# Patient Record
Sex: MALE | Race: WHITE
[De-identification: names, ages, dates, MRNs, and addresses within clinical notes are randomized per-mention and may not be internally consistent; named-entity substitution may affect disease eponyms.]

---

## 2018-01-28 ENCOUNTER — HOSPITAL ENCOUNTER (EMERGENCY)
Dept: HOSPITAL 19 - COL.ER | Age: 73
Discharge: HOME | End: 2018-01-28
Payer: MEDICARE

## 2018-01-28 VITALS — BODY MASS INDEX: 23.74 KG/M2 | WEIGHT: 160.28 LBS | HEIGHT: 69.02 IN

## 2018-01-28 VITALS — SYSTOLIC BLOOD PRESSURE: 140 MMHG | HEART RATE: 88 BPM | DIASTOLIC BLOOD PRESSURE: 88 MMHG

## 2018-01-28 VITALS — TEMPERATURE: 97.9 F

## 2018-01-28 DIAGNOSIS — E11.9: ICD-10-CM

## 2018-01-28 DIAGNOSIS — I10: ICD-10-CM

## 2018-01-28 DIAGNOSIS — M54.41: Primary | ICD-10-CM

## 2018-01-28 DIAGNOSIS — M79.604: ICD-10-CM

## 2018-01-28 DIAGNOSIS — Z79.84: ICD-10-CM

## 2018-01-28 DIAGNOSIS — E78.5: ICD-10-CM

## 2018-01-28 DIAGNOSIS — Z79.82: ICD-10-CM

## 2019-08-15 ENCOUNTER — HOSPITAL ENCOUNTER (EMERGENCY)
Dept: HOSPITAL 19 - COL.ER | Age: 74
Discharge: HOME | End: 2019-08-15
Payer: MEDICARE

## 2019-08-15 VITALS — WEIGHT: 164.24 LBS | HEIGHT: 67.99 IN | BODY MASS INDEX: 24.89 KG/M2

## 2019-08-15 VITALS — SYSTOLIC BLOOD PRESSURE: 119 MMHG | HEART RATE: 84 BPM | DIASTOLIC BLOOD PRESSURE: 82 MMHG

## 2019-08-15 VITALS — TEMPERATURE: 98.7 F
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
## 2019-08-15 DIAGNOSIS — F17.210: ICD-10-CM

## 2019-08-15 DIAGNOSIS — E11.9: ICD-10-CM

## 2019-08-15 DIAGNOSIS — S22.42XA: Primary | ICD-10-CM

## 2019-08-15 DIAGNOSIS — E78.5: ICD-10-CM

## 2019-08-15 DIAGNOSIS — I10: ICD-10-CM
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
## 2019-08-15 DIAGNOSIS — Z79.82: ICD-10-CM

## 2019-08-15 DIAGNOSIS — Z79.84: ICD-10-CM

## 2019-08-15 DIAGNOSIS — Z88.1: ICD-10-CM

## 2019-08-15 PROCEDURE — A9284 NON-ELECTRONIC SPIROMETER: HCPCS

## 2021-09-24 ENCOUNTER — HOSPITAL ENCOUNTER (OUTPATIENT)
Dept: HOSPITAL 19 - COL.CAR | Age: 76
Discharge: HOME | End: 2021-09-24
Attending: INTERNAL MEDICINE
Payer: MEDICARE

## 2021-09-24 VITALS — TEMPERATURE: 97.9 F | SYSTOLIC BLOOD PRESSURE: 126 MMHG | DIASTOLIC BLOOD PRESSURE: 66 MMHG | HEART RATE: 86 BPM

## 2021-09-24 VITALS — SYSTOLIC BLOOD PRESSURE: 129 MMHG | HEART RATE: 68 BPM | DIASTOLIC BLOOD PRESSURE: 76 MMHG | TEMPERATURE: 97.9 F

## 2021-09-24 VITALS — SYSTOLIC BLOOD PRESSURE: 120 MMHG | HEART RATE: 82 BPM | DIASTOLIC BLOOD PRESSURE: 75 MMHG | TEMPERATURE: 97.9 F

## 2021-09-24 VITALS — WEIGHT: 160.28 LBS | BODY MASS INDEX: 24.29 KG/M2 | HEIGHT: 68.04 IN

## 2021-09-24 VITALS — HEART RATE: 88 BPM | TEMPERATURE: 97.9 F | SYSTOLIC BLOOD PRESSURE: 128 MMHG | DIASTOLIC BLOOD PRESSURE: 68 MMHG

## 2021-09-24 VITALS — SYSTOLIC BLOOD PRESSURE: 153 MMHG | DIASTOLIC BLOOD PRESSURE: 77 MMHG | HEART RATE: 58 BPM

## 2021-09-24 VITALS — DIASTOLIC BLOOD PRESSURE: 82 MMHG | SYSTOLIC BLOOD PRESSURE: 120 MMHG | TEMPERATURE: 97.9 F | HEART RATE: 78 BPM

## 2021-09-24 VITALS — SYSTOLIC BLOOD PRESSURE: 121 MMHG | TEMPERATURE: 97.9 F | HEART RATE: 72 BPM | DIASTOLIC BLOOD PRESSURE: 72 MMHG

## 2021-09-24 VITALS — DIASTOLIC BLOOD PRESSURE: 80 MMHG | HEART RATE: 64 BPM | SYSTOLIC BLOOD PRESSURE: 131 MMHG | TEMPERATURE: 97.9 F

## 2021-09-24 VITALS — HEART RATE: 63 BPM | SYSTOLIC BLOOD PRESSURE: 129 MMHG | DIASTOLIC BLOOD PRESSURE: 82 MMHG | TEMPERATURE: 97.9 F

## 2021-09-24 VITALS — SYSTOLIC BLOOD PRESSURE: 120 MMHG | HEART RATE: 79 BPM | TEMPERATURE: 97.9 F | DIASTOLIC BLOOD PRESSURE: 68 MMHG

## 2021-09-24 DIAGNOSIS — K21.9: ICD-10-CM

## 2021-09-24 DIAGNOSIS — Z79.4: ICD-10-CM

## 2021-09-24 DIAGNOSIS — Z79.02: ICD-10-CM

## 2021-09-24 DIAGNOSIS — Z85.51: ICD-10-CM

## 2021-09-24 DIAGNOSIS — Z20.822: ICD-10-CM

## 2021-09-24 DIAGNOSIS — Z79.82: ICD-10-CM

## 2021-09-24 DIAGNOSIS — F17.290: ICD-10-CM

## 2021-09-24 DIAGNOSIS — Z79.899: ICD-10-CM

## 2021-09-24 DIAGNOSIS — E11.9: ICD-10-CM

## 2021-09-24 DIAGNOSIS — R94.39: ICD-10-CM

## 2021-09-24 DIAGNOSIS — I25.10: Primary | ICD-10-CM

## 2021-09-24 DIAGNOSIS — I35.0: ICD-10-CM

## 2021-09-24 DIAGNOSIS — E78.2: ICD-10-CM

## 2021-09-24 DIAGNOSIS — D64.9: ICD-10-CM

## 2021-09-24 DIAGNOSIS — I10: ICD-10-CM

## 2021-09-24 DIAGNOSIS — F32.9: ICD-10-CM

## 2021-09-24 LAB
ANION GAP SERPL CALC-SCNC: 10 MMOL/L
APTT PPP: 31.6 SECONDS (ref 26–37)
BUN SERPL-MCNC: 27 MG/DL (ref 8–26)
CALCIUM SERPL-MCNC: 10.5 MG/DL (ref 8.4–10.2)
CHLORIDE SERPL-SCNC: 104 MMOL/L (ref 98–107)
CO2 SERPL-SCNC: 24 MEQ/L (ref 23–31)
CREAT SERPL-SCNC: 1.99 MG/DL (ref 0.72–1.25)
ERYTHROCYTE [DISTWIDTH] IN BLOOD BY AUTOMATED COUNT: 13.3 % (ref 11.5–14.5)
GLUCOSE SERPL-MCNC: 142 MG/DL (ref 70–99)
HCT VFR BLD AUTO: 39.2 % (ref 42–52)
HGB BLD-MCNC: 12.8 G/DL (ref 13.5–18)
INR BLD: 1.1 (ref 0.8–3)
MCH RBC QN AUTO: 31 PG (ref 27–31)
MCHC RBC AUTO-ENTMCNC: 33 G/DL (ref 33–37)
MCV RBC AUTO: 95 FL (ref 80–100)
PLATELET # BLD AUTO: 239 K/MM3 (ref 130–400)
PMV BLD AUTO: 10.9 FL (ref 7.4–10.4)
POTASSIUM SERPL-SCNC: 4.3 MMOL/L (ref 3.5–4.5)
PROTHROMBIN TIME: 12.1 SECONDS (ref 9.7–12.8)
RBC # BLD AUTO: 4.14 M/MM3 (ref 4.2–5.6)
SODIUM SERPL-SCNC: 138 MMOL/L (ref 136–145)

## 2021-09-24 NOTE — NUR
15 cc air released from right tband and dressing applied.  INT discontinued
intact. Ambulated to bathroom with steady gait. Discharge instructions given
to pt/spouse. Transferred to private car by chris

## 2021-09-24 NOTE — NUR
Report from Nate LOYA. Transferred by bed from cath lab. Right tband with 15 cc
CD&I, Good pulses and cap refill < 3 secs noted. VSS. Spouse bedside

## 2021-11-29 ENCOUNTER — HOSPITAL ENCOUNTER (OUTPATIENT)
Dept: HOSPITAL 19 - COL.PUL | Age: 76
End: 2021-11-29
Attending: FAMILY MEDICINE
Payer: MEDICARE

## 2021-11-29 DIAGNOSIS — F17.200: ICD-10-CM

## 2021-11-29 DIAGNOSIS — Z12.2: Primary | ICD-10-CM

## 2021-11-29 DIAGNOSIS — R91.1: ICD-10-CM

## 2022-09-01 ENCOUNTER — HOSPITAL ENCOUNTER (OUTPATIENT)
Dept: HOSPITAL 19 - COL.RAD | Age: 77
End: 2022-09-01
Attending: FAMILY MEDICINE
Payer: MEDICARE

## 2022-09-01 DIAGNOSIS — U09.9: ICD-10-CM

## 2022-09-01 DIAGNOSIS — I25.10: Primary | ICD-10-CM

## 2022-09-09 ENCOUNTER — HOSPITAL ENCOUNTER (OUTPATIENT)
Dept: HOSPITAL 19 - COL.ER | Age: 77
Setting detail: OBSERVATION
LOS: 3 days | Discharge: HOME | End: 2022-09-12
Attending: INTERNAL MEDICINE | Admitting: INTERNAL MEDICINE
Payer: MEDICARE

## 2022-09-09 VITALS — SYSTOLIC BLOOD PRESSURE: 107 MMHG | TEMPERATURE: 98.5 F | DIASTOLIC BLOOD PRESSURE: 61 MMHG | HEART RATE: 58 BPM

## 2022-09-09 VITALS — BODY MASS INDEX: 23.66 KG/M2 | WEIGHT: 156.09 LBS | HEIGHT: 67.99 IN

## 2022-09-09 DIAGNOSIS — I25.10: ICD-10-CM

## 2022-09-09 DIAGNOSIS — N17.9: ICD-10-CM

## 2022-09-09 DIAGNOSIS — I35.0: ICD-10-CM

## 2022-09-09 DIAGNOSIS — D50.0: ICD-10-CM

## 2022-09-09 DIAGNOSIS — R53.1: ICD-10-CM

## 2022-09-09 DIAGNOSIS — Z79.02: ICD-10-CM

## 2022-09-09 DIAGNOSIS — Z79.4: ICD-10-CM

## 2022-09-09 DIAGNOSIS — Z79.899: ICD-10-CM

## 2022-09-09 DIAGNOSIS — E87.1: ICD-10-CM

## 2022-09-09 DIAGNOSIS — N18.9: ICD-10-CM

## 2022-09-09 DIAGNOSIS — R53.81: ICD-10-CM

## 2022-09-09 DIAGNOSIS — E87.8: ICD-10-CM

## 2022-09-09 DIAGNOSIS — U07.1: Primary | ICD-10-CM

## 2022-09-09 DIAGNOSIS — F17.200: ICD-10-CM

## 2022-09-09 DIAGNOSIS — E83.52: ICD-10-CM

## 2022-09-09 DIAGNOSIS — E11.22: ICD-10-CM

## 2022-09-09 DIAGNOSIS — R55: ICD-10-CM

## 2022-09-09 DIAGNOSIS — I12.9: ICD-10-CM

## 2022-09-09 DIAGNOSIS — E87.5: ICD-10-CM

## 2022-09-09 DIAGNOSIS — E78.5: ICD-10-CM

## 2022-09-09 DIAGNOSIS — E87.2: ICD-10-CM

## 2022-09-09 LAB
ALBUMIN SERPL-MCNC: 3.6 GM/DL (ref 3.4–4.8)
ALP SERPL-CCNC: 36 U/L (ref 40–150)
ALT SERPL-CCNC: 15 U/L (ref 0–55)
ANION GAP SERPL CALC-SCNC: 16 MMOL/L (ref 7–16)
AST SERPL-CCNC: 18 U/L (ref 5–34)
BASOPHILS # BLD: 0 K/MM3 (ref 0–0.2)
BASOPHILS NFR BLD AUTO: 0.1 % (ref 0–2)
BILIRUB SERPL-MCNC: 0.6 MG/DL (ref 0.2–1.2)
BUN SERPL-MCNC: 64 MG/DL (ref 8–26)
CALCIUM SERPL-MCNC: 10.1 MG/DL (ref 8.4–10.2)
CHLORIDE SERPL-SCNC: 95 MMOL/L (ref 98–107)
CK SERPL-CCNC: 106 U/L (ref 30–200)
CO2 SERPL-SCNC: 19 MMOL/L (ref 23–31)
CREAT SERPL-SCNC: 2.62 MG/DL (ref 0.72–1.25)
CRP SERPL-MCNC: 0.18 MG/DL (ref 0–0.5)
EOSINOPHIL # BLD: 0 K/MM3 (ref 0–0.7)
EOSINOPHIL NFR BLD: 0.1 % (ref 0–4)
ERYTHROCYTE [DISTWIDTH] IN BLOOD BY AUTOMATED COUNT: 14.6 % (ref 11.5–14.5)
GLUCOSE SERPL-MCNC: 362 MG/DL (ref 70–99)
GLUCOSE UR QL STRIP.AUTO: (no result)
GRANULOCYTES # BLD AUTO: 86.4 % (ref 42.2–75.2)
HCT VFR BLD AUTO: 27.4 % (ref 42–52)
HGB BLD-MCNC: 9.1 G/DL (ref 13.5–18)
LYMPHOCYTES # BLD: 1.3 K/MM3 (ref 1.2–3.4)
LYMPHOCYTES NFR BLD: 9.7 % (ref 20–51)
MAGNESIUM SERPL-MCNC: 1.9 MG/DL (ref 1.6–2.6)
MCH RBC QN AUTO: 28 PG (ref 27–31)
MCHC RBC AUTO-ENTMCNC: 33 G/DL (ref 33–37)
MCV RBC AUTO: 84 FL (ref 80–100)
MONOCYTES # BLD: 0.4 K/MM3 (ref 0.1–0.6)
MONOCYTES NFR BLD AUTO: 2.8 % (ref 1.7–9.3)
NEUTROPHILS # BLD: 11.4 K/MM3 (ref 1.4–6.5)
PH UR STRIP.AUTO: 5 [PH] (ref 5–8.5)
PLATELET # BLD AUTO: 397 K/MM3 (ref 130–400)
PMV BLD AUTO: 10.8 FL (ref 7.4–10.4)
POTASSIUM SERPL-SCNC: 4.8 MMOL/L (ref 3.5–4.5)
PROT SERPL-MCNC: 7.3 GM/DL (ref 6.2–8.1)
RBC # BLD AUTO: 3.25 M/MM3 (ref 4.2–5.6)
SODIUM SERPL-SCNC: 130 MMOL/L (ref 136–145)
SP GR UR STRIP.AUTO: 1.01 (ref 1–1.03)
TROPONIN I SERPL-MCNC: 0.01 NG/ML (ref 0–0.03)
URN COLLECT METHOD CLASS: (no result)
UROBILINOGEN UR STRIP.AUTO-MCNC: 0.2 E.U/DL (ref 0.2–1)

## 2022-09-09 PROCEDURE — A9270 NON-COVERED ITEM OR SERVICE: HCPCS

## 2022-09-09 PROCEDURE — C9113 INJ PANTOPRAZOLE SODIUM, VIA: HCPCS

## 2022-09-09 PROCEDURE — G0378 HOSPITAL OBSERVATION PER HR: HCPCS

## 2022-09-10 VITALS — SYSTOLIC BLOOD PRESSURE: 121 MMHG | HEART RATE: 82 BPM | TEMPERATURE: 97.8 F | DIASTOLIC BLOOD PRESSURE: 68 MMHG

## 2022-09-10 VITALS — DIASTOLIC BLOOD PRESSURE: 63 MMHG | TEMPERATURE: 97.8 F | HEART RATE: 75 BPM | SYSTOLIC BLOOD PRESSURE: 124 MMHG

## 2022-09-10 VITALS — SYSTOLIC BLOOD PRESSURE: 159 MMHG | DIASTOLIC BLOOD PRESSURE: 73 MMHG

## 2022-09-10 VITALS — HEART RATE: 72 BPM | SYSTOLIC BLOOD PRESSURE: 104 MMHG | DIASTOLIC BLOOD PRESSURE: 56 MMHG | TEMPERATURE: 98.5 F

## 2022-09-10 VITALS — HEART RATE: 64 BPM | DIASTOLIC BLOOD PRESSURE: 67 MMHG | TEMPERATURE: 98 F | SYSTOLIC BLOOD PRESSURE: 121 MMHG

## 2022-09-10 VITALS — SYSTOLIC BLOOD PRESSURE: 125 MMHG | DIASTOLIC BLOOD PRESSURE: 58 MMHG | TEMPERATURE: 97.9 F | HEART RATE: 68 BPM

## 2022-09-10 VITALS — HEART RATE: 71 BPM | TEMPERATURE: 98.9 F | DIASTOLIC BLOOD PRESSURE: 57 MMHG | SYSTOLIC BLOOD PRESSURE: 109 MMHG

## 2022-09-10 LAB
ALBUMIN SERPL-MCNC: 3 GM/DL (ref 3.4–4.8)
ALP SERPL-CCNC: 30 U/L (ref 40–150)
ALT SERPL-CCNC: 13 U/L (ref 0–55)
ANION GAP SERPL CALC-SCNC: 11 MMOL/L (ref 7–16)
AST SERPL-CCNC: 18 U/L (ref 5–34)
BASOPHILS # BLD: 0 K/MM3 (ref 0–0.2)
BASOPHILS NFR BLD AUTO: 0 % (ref 0–2)
BILIRUB SERPL-MCNC: 0.4 MG/DL (ref 0.2–1.2)
BUN SERPL-MCNC: 54 MG/DL (ref 8–26)
CALCIUM SERPL-MCNC: 8.9 MG/DL (ref 8.4–10.2)
CHLORIDE SERPL-SCNC: 103 MMOL/L (ref 98–107)
CO2 SERPL-SCNC: 22 MMOL/L (ref 23–31)
CREAT SERPL-SCNC: 2.1 MG/DL (ref 0.72–1.25)
EOSINOPHIL # BLD: 0.1 K/MM3 (ref 0–0.7)
EOSINOPHIL NFR BLD: 1.3 % (ref 0–4)
ERYTHROCYTE [DISTWIDTH] IN BLOOD BY AUTOMATED COUNT: 14.9 % (ref 11.5–14.5)
GLUCOSE SERPL-MCNC: 150 MG/DL (ref 70–99)
GRANULOCYTES # BLD AUTO: 67.2 % (ref 42.2–75.2)
HCT VFR BLD AUTO: 23.9 % (ref 42–52)
HGB BLD-MCNC: 7.8 G/DL (ref 13.5–18)
LYMPHOCYTES # BLD: 1.6 K/MM3 (ref 1.2–3.4)
LYMPHOCYTES NFR BLD: 23.9 % (ref 20–51)
MCH RBC QN AUTO: 28 PG (ref 27–31)
MCHC RBC AUTO-ENTMCNC: 33 G/DL (ref 33–37)
MCV RBC AUTO: 86 FL (ref 80–100)
MONOCYTES # BLD: 0.5 K/MM3 (ref 0.1–0.6)
MONOCYTES NFR BLD AUTO: 6.7 % (ref 1.7–9.3)
NEUTROPHILS # BLD: 4.6 K/MM3 (ref 1.4–6.5)
PLATELET # BLD AUTO: 268 K/MM3 (ref 130–400)
PMV BLD AUTO: 10.7 FL (ref 7.4–10.4)
POTASSIUM SERPL-SCNC: 3.7 MMOL/L (ref 3.5–4.5)
PROT SERPL-MCNC: 5.9 GM/DL (ref 6.2–8.1)
RBC # BLD AUTO: 2.78 M/MM3 (ref 4.2–5.6)
SODIUM SERPL-SCNC: 136 MMOL/L (ref 136–145)

## 2022-09-10 NOTE — NUR
PT ARRIVED TO THE MEDICAL FLOOR AT 2130 TO ROOM 305. PT A&O X 4; VSS; O2 RA.
PT DENIED GENERAL PAIN, CHEST PAIN, PALPITATIONS, SOB, N,V,D OR DIZZINESS.
ADMISSIONS ASSESSMENT AND MED REC COMPLETE. PT ORIENTED TO ROOM AND HOSPITAL
POLICY. POC DISCUSSED WITH PT. PT VERBALIZED UNDERSTANDING. ALL QUESTIONS AND
CONCERNS ADDRESSED. PT EXPRESSED NO ADDITIONAL NEEDS AT THIS TIME. CALL LIGHT
WITHIN REACH.

## 2022-09-10 NOTE — NUR
PT VSS, ORIENT AND ALERT X4, AMBULATORY, DUE MEDS GIVEN AS PRESCRIBED NO
ADVERSE REACTION NOTED.PT DENIES COMPLAINTS INCLUDING PAIN.STOOL FOR OCCULT
COLLECTED AND TAKED TO THE LAB.PT AWAINTING CHEST XRAY

## 2022-09-11 VITALS — TEMPERATURE: 98 F | HEART RATE: 78 BPM | DIASTOLIC BLOOD PRESSURE: 61 MMHG | SYSTOLIC BLOOD PRESSURE: 119 MMHG

## 2022-09-11 VITALS — DIASTOLIC BLOOD PRESSURE: 71 MMHG | TEMPERATURE: 97.7 F | SYSTOLIC BLOOD PRESSURE: 130 MMHG | HEART RATE: 72 BPM

## 2022-09-11 VITALS — TEMPERATURE: 98.2 F | HEART RATE: 84 BPM | SYSTOLIC BLOOD PRESSURE: 120 MMHG | DIASTOLIC BLOOD PRESSURE: 60 MMHG

## 2022-09-11 VITALS — SYSTOLIC BLOOD PRESSURE: 135 MMHG | TEMPERATURE: 97.8 F | HEART RATE: 75 BPM | DIASTOLIC BLOOD PRESSURE: 57 MMHG

## 2022-09-11 VITALS — DIASTOLIC BLOOD PRESSURE: 58 MMHG | SYSTOLIC BLOOD PRESSURE: 105 MMHG | HEART RATE: 85 BPM | TEMPERATURE: 98.7 F

## 2022-09-11 LAB
ALBUMIN SERPL-MCNC: 2.9 GM/DL (ref 3.4–4.8)
ALP SERPL-CCNC: 27 U/L (ref 40–150)
ALT SERPL-CCNC: 13 U/L (ref 0–55)
ANION GAP SERPL CALC-SCNC: 9 MMOL/L (ref 7–16)
AST SERPL-CCNC: 20 U/L (ref 5–34)
BASOPHILS # BLD: 0 K/MM3 (ref 0–0.2)
BASOPHILS NFR BLD AUTO: 0.2 % (ref 0–2)
BILIRUB SERPL-MCNC: 0.3 MG/DL (ref 0.2–1.2)
BUN SERPL-MCNC: 34 MG/DL (ref 8–26)
CALCIUM SERPL-MCNC: 8.5 MG/DL (ref 8.4–10.2)
CHLORIDE SERPL-SCNC: 108 MMOL/L (ref 98–107)
CO2 SERPL-SCNC: 21 MMOL/L (ref 23–31)
CREAT SERPL-SCNC: 1.58 MG/DL (ref 0.72–1.25)
EOSINOPHIL # BLD: 0.1 K/MM3 (ref 0–0.7)
EOSINOPHIL NFR BLD: 1.7 % (ref 0–4)
ERYTHROCYTE [DISTWIDTH] IN BLOOD BY AUTOMATED COUNT: 14.8 % (ref 11.5–14.5)
GLUCOSE SERPL-MCNC: 138 MG/DL (ref 70–99)
GRANULOCYTES # BLD AUTO: 72.8 % (ref 42.2–75.2)
HCT VFR BLD AUTO: 23 % (ref 42–52)
HCT VFR BLD AUTO: 25 % (ref 42–52)
HGB BLD-MCNC: 7.4 G/DL (ref 13.5–18)
HGB BLD-MCNC: 8.2 G/DL (ref 13.5–18)
LYMPHOCYTES # BLD: 0.9 K/MM3 (ref 1.2–3.4)
LYMPHOCYTES NFR BLD: 16.5 % (ref 20–51)
MCH RBC QN AUTO: 28 PG (ref 27–31)
MCHC RBC AUTO-ENTMCNC: 32 G/DL (ref 33–37)
MCV RBC AUTO: 88 FL (ref 80–100)
MONOCYTES # BLD: 0.4 K/MM3 (ref 0.1–0.6)
MONOCYTES NFR BLD AUTO: 8 % (ref 1.7–9.3)
NEUTROPHILS # BLD: 3.8 K/MM3 (ref 1.4–6.5)
PLATELET # BLD AUTO: 223 K/MM3 (ref 130–400)
PMV BLD AUTO: 10.8 FL (ref 7.4–10.4)
POTASSIUM SERPL-SCNC: 3.6 MMOL/L (ref 3.5–4.5)
PROT SERPL-MCNC: 5.6 GM/DL (ref 6.2–8.1)
RBC # BLD AUTO: 2.63 M/MM3 (ref 4.2–5.6)
SODIUM SERPL-SCNC: 138 MMOL/L (ref 136–145)

## 2022-09-11 NOTE — NUR
VSS, PT A&O X4, PT ABLE TO MAKE NEEDS KNOWN, PT RESTING IN BED, PT ABLE TO
MAKE NEEDS KNOWN, CALL LIGHT IN REACH, PT DENIES PAIN

## 2022-09-12 VITALS — HEART RATE: 79 BPM | TEMPERATURE: 98 F | DIASTOLIC BLOOD PRESSURE: 64 MMHG | SYSTOLIC BLOOD PRESSURE: 108 MMHG

## 2022-09-12 VITALS — HEART RATE: 76 BPM | DIASTOLIC BLOOD PRESSURE: 73 MMHG | TEMPERATURE: 98 F | SYSTOLIC BLOOD PRESSURE: 129 MMHG

## 2022-09-12 VITALS — SYSTOLIC BLOOD PRESSURE: 115 MMHG | TEMPERATURE: 98 F | DIASTOLIC BLOOD PRESSURE: 59 MMHG | HEART RATE: 83 BPM

## 2022-09-12 VITALS — SYSTOLIC BLOOD PRESSURE: 101 MMHG | DIASTOLIC BLOOD PRESSURE: 58 MMHG | HEART RATE: 60 BPM

## 2022-09-12 VITALS — TEMPERATURE: 99.3 F | SYSTOLIC BLOOD PRESSURE: 123 MMHG | HEART RATE: 78 BPM | DIASTOLIC BLOOD PRESSURE: 59 MMHG

## 2022-09-12 LAB
ALBUMIN SERPL-MCNC: 3 GM/DL (ref 3.4–4.8)
ALP SERPL-CCNC: 28 U/L (ref 40–150)
ALT SERPL-CCNC: 13 U/L (ref 0–55)
ANION GAP SERPL CALC-SCNC: 9 MMOL/L (ref 7–16)
AST SERPL-CCNC: 21 U/L (ref 5–34)
BASOPHILS # BLD: 0 K/MM3 (ref 0–0.2)
BASOPHILS NFR BLD AUTO: 0.2 % (ref 0–2)
BILIRUB SERPL-MCNC: 0.4 MG/DL (ref 0.2–1.2)
BUN SERPL-MCNC: 24 MG/DL (ref 8–26)
CALCIUM SERPL-MCNC: 8.6 MG/DL (ref 8.4–10.2)
CHLORIDE SERPL-SCNC: 111 MMOL/L (ref 98–107)
CO2 SERPL-SCNC: 21 MMOL/L (ref 23–31)
CREAT SERPL-SCNC: 1.62 MG/DL (ref 0.72–1.25)
EOSINOPHIL # BLD: 0.1 K/MM3 (ref 0–0.7)
EOSINOPHIL NFR BLD: 2.1 % (ref 0–4)
ERYTHROCYTE [DISTWIDTH] IN BLOOD BY AUTOMATED COUNT: 15.1 % (ref 11.5–14.5)
GLUCOSE SERPL-MCNC: 176 MG/DL (ref 70–99)
GRANULOCYTES # BLD AUTO: 71.2 % (ref 42.2–75.2)
HCT VFR BLD AUTO: 22.6 % (ref 42–52)
HGB BLD-MCNC: 7.2 G/DL (ref 13.5–18)
LYMPHOCYTES # BLD: 0.9 K/MM3 (ref 1.2–3.4)
LYMPHOCYTES NFR BLD: 16.6 % (ref 20–51)
MCH RBC QN AUTO: 28 PG (ref 27–31)
MCHC RBC AUTO-ENTMCNC: 32 G/DL (ref 33–37)
MCV RBC AUTO: 88 FL (ref 80–100)
MONOCYTES # BLD: 0.5 K/MM3 (ref 0.1–0.6)
MONOCYTES NFR BLD AUTO: 9.1 % (ref 1.7–9.3)
NEUTROPHILS # BLD: 3.8 K/MM3 (ref 1.4–6.5)
PLATELET # BLD AUTO: 220 K/MM3 (ref 130–400)
PMV BLD AUTO: 10.9 FL (ref 7.4–10.4)
POTASSIUM SERPL-SCNC: 3.8 MMOL/L (ref 3.5–4.5)
PROT SERPL-MCNC: 6.1 GM/DL (ref 6.2–8.1)
RBC # BLD AUTO: 2.56 M/MM3 (ref 4.2–5.6)
SODIUM SERPL-SCNC: 141 MMOL/L (ref 136–145)

## 2022-09-12 NOTE — NUR
contacted patient by room phone to discuss discharge planning as
he is in isolation for [facility]. Patient lives in [place] with his wife, Crystal
(ph#[phone]) and sees Dr. Kerry Neves for primary care. Patient obtains
medications from [employer] [facility] with no difficulties and does not use
any DME. Patient is independent with [facility] and plans to return home today once
discharged. Patient advised his wife, Crystal is his DPOA-HC.
 
Discharge Plan: Home

## 2022-09-12 NOTE — NUR
DR. SAWANT NOTIFIED THAT FOLLOW UP FOR COLONOSCOPY NOT SCHEDULED, NUMBER
PROVIDED TO DR. [facility] ON DISMISSAL PAPERWORK, PT EDUCATED
THAT HIS COLONOSCOPY NEEDS TO BE SCHEDULED SOON AS AN OUTPATIENT, PT
VERBALIZES UNDERSTANDING

## 2022-09-12 NOTE — NUR
PT EDUCATED ON DISMISSAL INSTRUCTIONS/MEDICATION INSTRUCTIONS/FOLLOW UP
INSTRUCTIONS, PT VOICED NO QUESTIONS/CONCERNS AT TIME OF DISMISSAL, PT
TRANSPORTED TO EXIT PER WC, IV DC'D, NO FURTHER CONCERNS

## 2025-06-15 NOTE — NUR
ASSESSMENT COMPLETE FOR NIGHT SHIFT. PT RESTING IN BED NAPPING. PT DENIED
GENERAL PAIN, CHEST PAIN, PALPITATIONS, N,V,D, SOB OR DIZZINESS. AROUND
2100HRS, THE AID CAME TO ME STATING THE PT STATED THAT WHEN HE TRIED TO
ORDER DINNER EARLIER, HE WAS TOLD HE COULDN'T ORDER FOOD BECAUSE HE WAS NPO.
THE AID (AFTER SPEAKING WITH ME), STATED THAT SHE OFFER THE PT A SANDWICH BOX.
HOWEVER, PT STATED, "THAT'S OK, HE WASN'T THAT HUNGRY ANYWAY." OUT OF CONCERN
FOR PT'S BS, WE MADE SURE THAT PT GOT A SANDWICH BOX ANYWAY, WHICH PT DID EAT.
UPON LOOKING, AN NPO ORDER WAS PLACED STARTING AT 1356HRS. CONSENT SIGNED FOR
EGD. PT NPO AT MIDNIGHT. PT'S WIFE CALLED CHARGE RN UPSET ABOUT PT NOT GETTING
DINNER AT THE TIME HE SHOULD HAVE. CHARGE RN EXPLAINED, PT GIVEN FOOD, ONCE WE
FOUND OUT ABOUT HIM, NOT BEING ABLE TO GET FOOD. SHE WAS NOT SATISFIED WITH
THAT ANSWER. SHE WANTED TO KNOW WHY THERE WAS A MIX-UP IN THE FIRST PLACE. PT
EXPRESSED NO ADDITIONAL NEEDS AT THIS TIME. CALL LIGHT WITHIN REACH. bilateral upper extremity Active ROM was WFL (within functional limits)/bilateral  lower extremity Active ROM was WFL (within functional limits)